# Patient Record
Sex: MALE | Race: WHITE | URBAN - METROPOLITAN AREA
[De-identification: names, ages, dates, MRNs, and addresses within clinical notes are randomized per-mention and may not be internally consistent; named-entity substitution may affect disease eponyms.]

---

## 2017-01-24 ENCOUNTER — EMERGENCY (EMERGENCY)
Facility: HOSPITAL | Age: 52
LOS: 0 days | Discharge: HOME | End: 2017-01-24
Admitting: EMERGENCY MEDICINE
Payer: COMMERCIAL

## 2017-01-24 DIAGNOSIS — S63.619A UNSPECIFIED SPRAIN OF UNSPECIFIED FINGER, INITIAL ENCOUNTER: ICD-10-CM

## 2017-01-24 DIAGNOSIS — S69.82XA OTHER SPECIFIED INJURIES OF LEFT WRIST, HAND AND FINGER(S), INITIAL ENCOUNTER: ICD-10-CM

## 2017-01-24 DIAGNOSIS — T14.8 OTHER INJURY OF UNSPECIFIED BODY REGION: ICD-10-CM

## 2017-01-24 DIAGNOSIS — S63.602A UNSPECIFIED SPRAIN OF LEFT THUMB, INITIAL ENCOUNTER: ICD-10-CM
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
## 2017-01-24 PROCEDURE — 99284 EMERGENCY DEPT VISIT MOD MDM: CPT | Mod: 25

## 2017-01-24 PROCEDURE — 73090 X-RAY EXAM OF FOREARM: CPT | Mod: 26,LT

## 2017-01-24 PROCEDURE — 73110 X-RAY EXAM OF WRIST: CPT | Mod: 26,LT

## 2017-01-24 PROCEDURE — 70450 CT HEAD/BRAIN W/O DYE: CPT | Mod: 26

## 2017-01-24 PROCEDURE — 73130 X-RAY EXAM OF HAND: CPT | Mod: 26,LT

## 2017-01-24 PROCEDURE — 29125 APPL SHORT ARM SPLINT STATIC: CPT | Mod: LT

## 2017-01-26 ENCOUNTER — TRANSCRIPTION ENCOUNTER (OUTPATIENT)
Age: 52
End: 2017-01-26

## 2017-06-16 ENCOUNTER — APPOINTMENT (OUTPATIENT)
Dept: NEUROLOGY | Facility: CLINIC | Age: 52
End: 2017-06-16

## 2017-06-16 VITALS — BODY MASS INDEX: 39.33 KG/M2 | SYSTOLIC BLOOD PRESSURE: 132 MMHG | DIASTOLIC BLOOD PRESSURE: 84 MMHG | WEIGHT: 290 LBS

## 2017-06-16 DIAGNOSIS — H53.8 OTHER VISUAL DISTURBANCES: ICD-10-CM

## 2017-06-16 RX ORDER — VITAMIN K2 90 MCG
125 MCG CAPSULE ORAL
Refills: 0 | Status: ACTIVE | COMMUNITY

## 2017-06-16 RX ORDER — AMOXICILLIN 875 MG/1
875 TABLET, FILM COATED ORAL
Qty: 20 | Refills: 0 | Status: COMPLETED | COMMUNITY
Start: 2017-03-06

## 2017-06-16 RX ORDER — DILTIAZEM HYDROCHLORIDE 180 MG/1
180 CAPSULE, EXTENDED RELEASE ORAL
Qty: 90 | Refills: 0 | Status: ACTIVE | COMMUNITY
Start: 2017-05-05

## 2017-07-05 ENCOUNTER — OUTPATIENT (OUTPATIENT)
Dept: OUTPATIENT SERVICES | Facility: HOSPITAL | Age: 52
LOS: 1 days | End: 2017-07-05
Payer: COMMERCIAL

## 2017-07-05 DIAGNOSIS — M50.13 CERVICAL DISC DISORDER WITH RADICULOPATHY, CERVICOTHORACIC REGION: ICD-10-CM

## 2017-07-06 ENCOUNTER — TRANSCRIPTION ENCOUNTER (OUTPATIENT)
Age: 52
End: 2017-07-06

## 2017-07-06 ENCOUNTER — APPOINTMENT (OUTPATIENT)
Dept: ORTHOPEDIC SURGERY | Facility: CLINIC | Age: 52
End: 2017-07-06

## 2017-07-19 ENCOUNTER — TRANSCRIPTION ENCOUNTER (OUTPATIENT)
Age: 52
End: 2017-07-19

## 2017-07-19 ENCOUNTER — OUTPATIENT (OUTPATIENT)
Dept: OUTPATIENT SERVICES | Facility: HOSPITAL | Age: 52
LOS: 1 days | End: 2017-07-19
Payer: COMMERCIAL

## 2017-07-19 DIAGNOSIS — M54.16 RADICULOPATHY, LUMBAR REGION: ICD-10-CM

## 2017-07-19 PROCEDURE — 77003 FLUOROGUIDE FOR SPINE INJECT: CPT

## 2017-07-19 PROCEDURE — 64484 NJX AA&/STRD TFRM EPI L/S EA: CPT | Mod: 50

## 2017-07-19 PROCEDURE — 64483 NJX AA&/STRD TFRM EPI L/S 1: CPT | Mod: 50

## 2017-07-19 PROCEDURE — 76000 FLUOROSCOPY <1 HR PHYS/QHP: CPT

## 2017-07-19 PROCEDURE — 62321 NJX INTERLAMINAR CRV/THRC: CPT

## 2017-10-10 ENCOUNTER — TRANSCRIPTION ENCOUNTER (OUTPATIENT)
Age: 52
End: 2017-10-10

## 2017-11-29 ENCOUNTER — APPOINTMENT (OUTPATIENT)
Dept: NEUROLOGY | Facility: CLINIC | Age: 52
End: 2017-11-29
Payer: COMMERCIAL

## 2017-11-29 VITALS
DIASTOLIC BLOOD PRESSURE: 62 MMHG | BODY MASS INDEX: 39.28 KG/M2 | SYSTOLIC BLOOD PRESSURE: 122 MMHG | HEIGHT: 72 IN | WEIGHT: 290 LBS

## 2017-11-29 DIAGNOSIS — F07.81 POSTCONCUSSIONAL SYNDROME: ICD-10-CM

## 2017-11-29 DIAGNOSIS — M54.12 RADICULOPATHY, CERVICAL REGION: ICD-10-CM
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
## 2017-11-29 PROCEDURE — 99214 OFFICE O/P EST MOD 30 MIN: CPT

## 2018-01-22 ENCOUNTER — TRANSCRIPTION ENCOUNTER (OUTPATIENT)
Age: 53
End: 2018-01-22

## 2018-05-15 ENCOUNTER — APPOINTMENT (OUTPATIENT)
Dept: NEUROLOGY | Facility: CLINIC | Age: 53
End: 2018-05-15

## 2019-03-15 ENCOUNTER — APPOINTMENT (OUTPATIENT)
Dept: NEUROLOGY | Facility: CLINIC | Age: 54
End: 2019-03-15
Payer: OTHER MISCELLANEOUS

## 2019-03-15 VITALS
SYSTOLIC BLOOD PRESSURE: 130 MMHG | BODY MASS INDEX: 40.63 KG/M2 | WEIGHT: 300 LBS | DIASTOLIC BLOOD PRESSURE: 80 MMHG | HEIGHT: 72 IN

## 2019-03-15 PROCEDURE — 99214 OFFICE O/P EST MOD 30 MIN: CPT

## 2019-09-06 ENCOUNTER — RESULT REVIEW (OUTPATIENT)
Age: 54
End: 2019-09-06

## 2020-02-11 ENCOUNTER — TRANSCRIPTION ENCOUNTER (OUTPATIENT)
Age: 55
End: 2020-02-11

## 2020-02-12 ENCOUNTER — OUTPATIENT (OUTPATIENT)
Dept: OUTPATIENT SERVICES | Facility: HOSPITAL | Age: 55
LOS: 1 days | End: 2020-02-12
Payer: COMMERCIAL

## 2020-02-12 DIAGNOSIS — M54.16 RADICULOPATHY, LUMBAR REGION: ICD-10-CM

## 2020-02-12 PROCEDURE — 76000 FLUOROSCOPY <1 HR PHYS/QHP: CPT

## 2020-02-12 PROCEDURE — 64483 NJX AA&/STRD TFRM EPI L/S 1: CPT | Mod: 50

## 2020-02-18 ENCOUNTER — TRANSCRIPTION ENCOUNTER (OUTPATIENT)
Age: 55
End: 2020-02-18

## 2020-02-19 ENCOUNTER — OUTPATIENT (OUTPATIENT)
Dept: OUTPATIENT SERVICES | Facility: HOSPITAL | Age: 55
LOS: 1 days | End: 2020-02-19
Payer: COMMERCIAL

## 2020-02-19 DIAGNOSIS — M54.12 RADICULOPATHY, CERVICAL REGION: ICD-10-CM

## 2020-02-19 PROCEDURE — 77003 FLUOROGUIDE FOR SPINE INJECT: CPT

## 2020-02-19 PROCEDURE — 62321 NJX INTERLAMINAR CRV/THRC: CPT

## 2020-08-07 DIAGNOSIS — Z01.818 ENCOUNTER FOR OTHER PREPROCEDURAL EXAMINATION: ICD-10-CM

## 2020-08-09 ENCOUNTER — APPOINTMENT (OUTPATIENT)
Dept: DISASTER EMERGENCY | Facility: CLINIC | Age: 55
End: 2020-08-09

## 2020-08-10 LAB — SARS-COV-2 N GENE NPH QL NAA+PROBE: NOT DETECTED

## 2020-08-11 ENCOUNTER — TRANSCRIPTION ENCOUNTER (OUTPATIENT)
Age: 55
End: 2020-08-11

## 2020-08-12 ENCOUNTER — OUTPATIENT (OUTPATIENT)
Dept: OUTPATIENT SERVICES | Facility: HOSPITAL | Age: 55
LOS: 1 days | End: 2020-08-12
Payer: COMMERCIAL

## 2020-08-12 DIAGNOSIS — M54.16 RADICULOPATHY, LUMBAR REGION: ICD-10-CM

## 2020-08-12 PROCEDURE — 64484 NJX AA&/STRD TFRM EPI L/S EA: CPT | Mod: LT

## 2020-08-12 PROCEDURE — 64483 NJX AA&/STRD TFRM EPI L/S 1: CPT | Mod: LT

## 2020-08-12 PROCEDURE — 76000 FLUOROSCOPY <1 HR PHYS/QHP: CPT

## 2021-03-19 ENCOUNTER — APPOINTMENT (OUTPATIENT)
Dept: NEUROLOGY | Facility: CLINIC | Age: 56
End: 2021-03-19
Payer: COMMERCIAL

## 2021-03-19 VITALS
TEMPERATURE: 98 F | HEIGHT: 72 IN | BODY MASS INDEX: 42.66 KG/M2 | HEART RATE: 89 BPM | DIASTOLIC BLOOD PRESSURE: 84 MMHG | SYSTOLIC BLOOD PRESSURE: 134 MMHG | WEIGHT: 315 LBS

## 2021-03-19 DIAGNOSIS — M54.16 RADICULOPATHY, LUMBAR REGION: ICD-10-CM

## 2021-03-19 DIAGNOSIS — H81.90 UNSPECIFIED DISORDER OF VESTIBULAR FUNCTION, UNSPECIFIED EAR: ICD-10-CM

## 2021-03-19 DIAGNOSIS — R25.2 CRAMP AND SPASM: ICD-10-CM

## 2021-03-19 PROCEDURE — 99072 ADDL SUPL MATRL&STAF TM PHE: CPT

## 2021-03-19 PROCEDURE — 99215 OFFICE O/P EST HI 40 MIN: CPT

## 2021-03-19 RX ORDER — ROSUVASTATIN CALCIUM 10 MG/1
10 TABLET, FILM COATED ORAL
Refills: 0 | Status: ACTIVE | COMMUNITY

## 2021-03-19 RX ORDER — QUETIAPINE 50 MG/1
50 TABLET, FILM COATED ORAL
Refills: 0 | Status: ACTIVE | COMMUNITY

## 2021-03-19 RX ORDER — DULOXETINE HYDROCHLORIDE 60 MG/1
60 CAPSULE, DELAYED RELEASE ORAL
Refills: 0 | Status: COMPLETED | COMMUNITY
End: 2021-03-19

## 2021-03-19 RX ORDER — PSYLLIUM HUSK 0.4 G
CAPSULE ORAL
Refills: 0 | Status: ACTIVE | COMMUNITY

## 2021-03-19 NOTE — REVIEW OF SYSTEMS
[Sleep Disturbances] : sleep disturbances [Anxiety] : anxiety [Depression] : depression [As Noted in HPI] : as noted in HPI [Decr. Concentrating Ability] : decreased concentrating ability [Numbness] : numbness [Tingling] : tingling [Negative] : Heme/Lymph [Poor Coordination] : poor coordination [Dizziness] : dizziness [Recent Weight Gain (___ Lbs)] : recent [unfilled] ~Ulb weight gain [Leg Weakness] : leg weakness [Recent Weight Loss (___ Lbs)] : no recent weight loss [Eyesight Problems] : no eyesight problems [Skin Lesions] : no skin lesions [Muscle Weakness] : no muscle weakness [de-identified] : shaking of hands. [FreeTextEntry4] : snoring

## 2021-03-19 NOTE — PHYSICAL EXAM
[General Appearance - Alert] : alert [General Appearance - In No Acute Distress] : in no acute distress [Impaired Insight] : insight and judgment were intact [Person] : oriented to person [Place] : oriented to place [Time] : oriented to time [Short Term Intact] : short term memory intact [Remote Intact] : remote memory intact [Registration Intact] : recent registration memory intact [Span Intact] : the attention span was normal [Concentration Intact] : normal concentrating ability [Naming Objects] : no difficulty naming common objects [Repeating Phrases] : no difficulty repeating a phrase [Fluency] : fluency intact [Comprehension] : comprehension intact [Current Events] : adequate knowledge of current events [Past History] : adequate knowledge of personal past history [Cranial Nerves Optic (II)] : visual acuity intact bilaterally,  visual fields full to confrontation, pupils equal round and reactive to light [Cranial Nerves Oculomotor (III)] : extraocular motion intact [Cranial Nerves Trigeminal (V)] : facial sensation intact symmetrically [Cranial Nerves Facial (VII)] : face symmetrical [Cranial Nerves Vestibulocochlear (VIII)] : hearing was intact bilaterally [Cranial Nerves Glossopharyngeal (IX)] : tongue and palate midline [Cranial Nerves Accessory (XI - Cranial And Spinal)] : head turning and shoulder shrug symmetric [Cranial Nerves Hypoglossal (XII)] : there was no tongue deviation with protrusion [Motor Tone] : muscle tone was normal in all four extremities [No Muscle Atrophy] : normal bulk in all four extremities [Sensation Tactile Decrease] : light touch was intact [Pain / Temp Decrease Radial Forearm, Thumb, Index - Rt Only] : diminished right radial forearm, thumb, and index finger (C6) [Pain / Temperature Decrease Middle Finger Only - Right Only] : diminished right middle finger (C7) [Pain / Temp Decr 4-5th Dig, Ulnar Hand, Dist Forearm Rt Only] : diminished right 4-5th digits and distal forearm (C8) [2+] : Brachioradialis left 2+ [No APD] : no afferent pupillary defect [Full Visual Field] : full visual field [Hearing Threshold Finger Rub Not [place]] : hearing was normal [Auscultation Breath Sounds / Voice Sounds] : lungs were clear to auscultation bilaterally [Heart Sounds] : normal S1 and S2 [Arterial Pulses Carotid] : carotid pulses were normal with no bruits [Edema] : there was no peripheral edema [No Spinal Tenderness] : no spinal tenderness [] : no rash [Papilledema Of Both Eyes] : no papilledema [Mood] : the mood was normal [Romberg's Sign] : a positive Romberg's sign was present [1+] : Patella left 1+ [0] : Ankle jerk left 0 [PERRL With Normal Accommodation] : pupils were equal in size, round, reactive to light, with normal accommodation [Extraocular Movements] : extraocular movements were intact [Involuntary Movements] : no involuntary movements were seen [FreeTextEntry1] : Obese [Past-pointing] : there was no past-pointing [Tremor] : no tremor present [Coordination - Dysmetria Impaired Finger-to-Nose Bilateral] : not present [Plantar Reflex Right Only] : normal on the right [Plantar Reflex Left Only] : normal on the left [FreeTextEntry6] : motor strength: No pronator drift, all muscle groups in the upper 5/5, left lower extremity HF 4+/5, others 5/5. [FreeTextEntry8] : Mildly antalgic gait, favors right leg

## 2021-03-19 NOTE — HISTORY OF PRESENT ILLNESS
[FreeTextEntry1] : 55-year-old male,with PMHx HTN, HLD, A-Fib, MIKY, anxiety/depression and chronic neck/LBP's, occasional vertigo, exacerbated by postural changes, cramps of hands Improved after carpal tunnel release surgery, is known to me in the past, last seen on 11/29/17 presents today with complaints of dizziness and gait instability\par \par Patient reports that he fell off the horse back about 6 months ago, landed on his left shoulder, he did not lose consciousness, He had persistent left shoulder pain following the fall, noted remarkable relief of left shoulder pain\par after surgery. Patient reports since this fall he has been experiencing dizziness and feels off balance, Denies visual blurring, double vision, headache, speech disturbance, tinnitus or ear pain. He has not had any falls but reports chronic low back pain for which he has received epidural steroid injections,he reports he is limping slightly as he feel his left leg is weak.\par \par Patient admits that he has gained tremendous amount of date, he has noted improvement off cramps of hands after he underwent carpal tunnel release surgery.\par \par

## 2021-03-19 NOTE — DISCUSSION/SUMMARY
[FreeTextEntry1] : 55-year-old male,with PMHx HTN, HLD, A-Fib, MIKY, anxiety/depression and chronic neck/LBP's, occasional vertigo, exacerbated by postural changes, cramps of hands Improved after carpal tunnel release surgery, presents today with complaints of dizziness and gait instability, symptoms started after he fell off the horse back about 6 months ago.\par \par # Most probably vestibular dysfunction; rule out CNS ischemic/ inflammatory lesions\par \par - I have recommended vestibular therapy twice weekly for 3 weeks\par - Will obtain MRI brain w/wo contrast\par \par \par - I have advised the patient to restart Crestor and continue aspirin\par \par #2) Lumbar DJD, possible left L 3/4 radiculopathy, s/p epidural steroid injections\par \par - F/U with ortho-pain management\par \par #3) Carpal tunnel syndromes bilateral S/P surgery with remarkable resolution of symptoms\par \par \par \par

## 2021-03-19 NOTE — DATA REVIEWED
[de-identified] : 4/8/16: MRI brain w/wo contrast: unchanged compared to prior study, multiple small foci of T2/flair hyperintensity in the subcortical white\par matter of bifrontal parietal lobes, no abnormal enhancement.\par 8/28/15; unchanged multiple foci of scattered flair signal hyperintensity in the bifrontal, right temporal and left parietal suboccipital white matter. These are nonspecific and reflect microvascular ischemia, demyelination or gliosis.\par MRI of the brain performed on 5/11/15; there was no evidence of acute infarct; several small foci of T2/fair intense hyperintensities in the bifrontal, left parietal and left temporal subcortical white matter were noted; differential includes migraine sequelae, demyelination, vasculitis, gliosis and early chronic microvascular ischemic changes.\par \par  [de-identified] :  24-hour ambulatory EEG monitoring performed from 5/7/15 -5/8/15 was ordered as a normal ambulate reduced as EEG recording.\par \par  [de-identified] : 5/19/16: EMG/NCV studies of upper extremities. The study is abnormal. The electrophysiological findings are consistent with mild-moderate median neuropathy at the wrists bilaterally. In addition, there is evidence of C5-6 radiculopathy; left more than right side.\par MRI cervical spine w/wo contrast: 8/28/15; minimal disc degeneration at C6-7 with mild loss of disc height. Mild narrowing of the left C4-5, bilateral C5-6 and left C6 to 7 neuroforamina do put on hold vertebral spurring. No cord impingement.\par MRI cervical spine: 4/17/15; new small C3-4 right-sided disc herniation which abuts the ventral spinal cord without compression. Multilevel degenerative changes superimposed on mild degree of congenital spinal canal stenosis. Unchanged shallow C6-7 proximal right foraminal disc herniation with moderate-severe right foraminal stenosis. Unchanged C5-6 moderate-severe bilateral foraminal stenosis.\par MRI lumbar spine: 4/17/15; mild right neural foraminal narrowing secondary to disc bulges. No significant spinal canal stenosis.

## 2021-03-23 ENCOUNTER — APPOINTMENT (OUTPATIENT)
Dept: DISASTER EMERGENCY | Facility: CLINIC | Age: 56
End: 2021-03-23

## 2021-03-24 LAB — SARS-COV-2 N GENE NPH QL NAA+PROBE: NOT DETECTED

## 2021-04-09 ENCOUNTER — OUTPATIENT (OUTPATIENT)
Dept: OUTPATIENT SERVICES | Facility: HOSPITAL | Age: 56
LOS: 1 days | End: 2021-04-09
Payer: COMMERCIAL

## 2021-04-09 ENCOUNTER — APPOINTMENT (OUTPATIENT)
Dept: MRI IMAGING | Facility: CLINIC | Age: 56
End: 2021-04-09
Payer: COMMERCIAL

## 2021-04-09 DIAGNOSIS — H81.90 UNSPECIFIED DISORDER OF VESTIBULAR FUNCTION, UNSPECIFIED EAR: ICD-10-CM

## 2021-04-09 PROCEDURE — A9585: CPT

## 2021-04-09 PROCEDURE — 70553 MRI BRAIN STEM W/O & W/DYE: CPT

## 2021-04-09 PROCEDURE — 70553 MRI BRAIN STEM W/O & W/DYE: CPT | Mod: 26

## 2021-06-01 ENCOUNTER — APPOINTMENT (OUTPATIENT)
Dept: NEUROLOGY | Facility: CLINIC | Age: 56
End: 2021-06-01

## 2021-10-19 ENCOUNTER — APPOINTMENT (OUTPATIENT)
Dept: DISASTER EMERGENCY | Facility: CLINIC | Age: 56
End: 2021-10-19

## 2021-10-20 LAB — SARS-COV-2 N GENE NPH QL NAA+PROBE: NOT DETECTED

## 2022-03-16 ENCOUNTER — TRANSCRIPTION ENCOUNTER (OUTPATIENT)
Age: 57
End: 2022-03-16

## 2022-05-19 ENCOUNTER — NON-APPOINTMENT (OUTPATIENT)
Age: 57
End: 2022-05-19

## 2022-06-02 ENCOUNTER — NON-APPOINTMENT (OUTPATIENT)
Age: 57
End: 2022-06-02

## 2023-06-27 ENCOUNTER — APPOINTMENT (OUTPATIENT)
Dept: NEUROLOGY | Facility: CLINIC | Age: 58
End: 2023-06-27
Payer: MEDICARE

## 2023-06-27 VITALS
SYSTOLIC BLOOD PRESSURE: 137 MMHG | DIASTOLIC BLOOD PRESSURE: 77 MMHG | WEIGHT: 315 LBS | HEIGHT: 71 IN | TEMPERATURE: 97.6 F | HEART RATE: 77 BPM | BODY MASS INDEX: 44.1 KG/M2

## 2023-06-27 DIAGNOSIS — G37.9 DEMYELINATING DISEASE OF CENTRAL NERVOUS SYSTEM, UNSPECIFIED: ICD-10-CM

## 2023-06-27 DIAGNOSIS — R41.3 OTHER AMNESIA: ICD-10-CM

## 2023-06-27 PROCEDURE — 99214 OFFICE O/P EST MOD 30 MIN: CPT

## 2023-06-27 RX ORDER — TIRZEPATIDE 2.5 MG/.5ML
2.5 INJECTION, SOLUTION SUBCUTANEOUS
Refills: 0 | Status: ACTIVE | COMMUNITY

## 2023-06-27 RX ORDER — GABAPENTIN 300 MG
300 TABLET ORAL 3 TIMES DAILY
Refills: 0 | Status: ACTIVE | COMMUNITY

## 2023-06-27 RX ORDER — DULOXETINE HYDROCHLORIDE 30 MG/1
30 CAPSULE, DELAYED RELEASE ORAL
Refills: 0 | Status: DISCONTINUED | COMMUNITY
End: 2023-06-27

## 2023-06-27 RX ORDER — DULOXETINE HYDROCHLORIDE 60 MG/1
60 CAPSULE, DELAYED RELEASE ORAL TWICE DAILY
Refills: 0 | Status: ACTIVE | COMMUNITY

## 2023-06-27 RX ORDER — CYCLOBENZAPRINE HYDROCHLORIDE 10 MG/1
10 TABLET, FILM COATED ORAL
Qty: 60 | Refills: 0 | Status: DISCONTINUED | COMMUNITY
Start: 2017-01-25 | End: 2023-06-27

## 2023-06-27 RX ORDER — FAMOTIDINE 40 MG/1
40 TABLET, FILM COATED ORAL TWICE DAILY
Refills: 0 | Status: ACTIVE | COMMUNITY

## 2023-06-27 RX ORDER — BUPROPION HYDROCHLORIDE 300 MG/1
300 TABLET, EXTENDED RELEASE ORAL
Refills: 0 | Status: DISCONTINUED | COMMUNITY
End: 2023-06-27

## 2023-06-27 RX ORDER — RANITIDINE HYDROCHLORIDE 300 MG/1
TABLET, FILM COATED ORAL
Refills: 0 | Status: DISCONTINUED | COMMUNITY
End: 2023-06-27

## 2023-06-27 NOTE — REASON FOR VISIT
[Follow-Up: _____] : a [unfilled] follow-up visit [FreeTextEntry1] : For memory decline and cognitive changes

## 2023-06-27 NOTE — DISCUSSION/SUMMARY
[FreeTextEntry1] : 57-year-old M,with PMHx HTN, HLD, A-Fib, MIKY, anxiety/depression and chronic neck/LBP's, occasional vertigo, cramps of hands Improved after carpal tunnel release surgery, dizziness and gait instability, after he fell off the horse back 2 years ago, has improved, now he presents with  difficulty with short-term memory and word retrieval.\par \par #Mild cognitive impairment ; possibly multiple etiologies \par \par - I have recommended B12, folate and TSH levels\par -Cognitive assessment in 10-12 weeks\par \par # Prior history of probable CNS demyelinating disease\par \par - Will obtain MRI brain w/wo contrast\par \par #Right SI joint pain and lumbar DJD, possible left L 3/4 radiculopathy, s/p epidural steroid injections\par \par - F/U with ortho-pain management\par \par # Carpal tunnel syndromes bilateral S/P surgery with remarkable resolution of symptoms\par \par \par \par

## 2023-06-27 NOTE — PHYSICAL EXAM
[General Appearance - Alert] : alert [General Appearance - In No Acute Distress] : in no acute distress [Impaired Insight] : insight and judgment were intact [Mood] : the mood was normal [Person] : oriented to person [Place] : oriented to place [Time] : oriented to time [Remote Intact] : remote memory intact [Registration Intact] : recent registration memory intact [Span Intact] : the attention span was normal [Concentration Intact] : normal concentrating ability [Naming Objects] : no difficulty naming common objects [Repeating Phrases] : no difficulty repeating a phrase [Fluency] : fluency intact [Comprehension] : comprehension intact [Current Events] : adequate knowledge of current events [Cranial Nerves Optic (II)] : visual acuity intact bilaterally,  visual fields full to confrontation, pupils equal round and reactive to light [Cranial Nerves Oculomotor (III)] : extraocular motion intact [Cranial Nerves Trigeminal (V)] : facial sensation intact symmetrically [Cranial Nerves Facial (VII)] : face symmetrical [Cranial Nerves Vestibulocochlear (VIII)] : hearing was intact bilaterally [Cranial Nerves Glossopharyngeal (IX)] : tongue and palate midline [Cranial Nerves Accessory (XI - Cranial And Spinal)] : head turning and shoulder shrug symmetric [Cranial Nerves Hypoglossal (XII)] : there was no tongue deviation with protrusion [Motor Tone] : muscle tone was normal in all four extremities [No Muscle Atrophy] : normal bulk in all four extremities [Sensation Tactile Decrease] : light touch was intact [Romberg's Sign] : a positive Romberg's sign was present [2+] : Brachioradialis left 2+ [1+] : Patella left 1+ [0] : Ankle jerk left 0 [PERRL With Normal Accommodation] : pupils were equal in size, round, reactive to light, with normal accommodation [Extraocular Movements] : extraocular movements were intact [No APD] : no afferent pupillary defect [Full Visual Field] : full visual field [Hearing Threshold Finger Rub Not [place]] : hearing was normal [Auscultation Breath Sounds / Voice Sounds] : lungs were clear to auscultation bilaterally [Heart Sounds] : normal S1 and S2 [Arterial Pulses Carotid] : carotid pulses were normal with no bruits [Edema] : there was no peripheral edema [No Spinal Tenderness] : no spinal tenderness [Involuntary Movements] : no involuntary movements were seen [] : no rash [Neck Cervical Mass (___cm)] : no neck mass was observed [FreeTextEntry1] : Obese [Past-pointing] : there was no past-pointing [Tremor] : no tremor present [Coordination - Dysmetria Impaired Finger-to-Nose Bilateral] : not present [Plantar Reflex Right Only] : normal on the right [Plantar Reflex Left Only] : normal on the left [FreeTextEntry6] : Motor strength: No pronator drift, all muscle groups in the upper 5/5, Right lower extremity HF 4+/5, others 5/5. [FreeTextEntry8] : Mildly antalgic gait, right-hip

## 2023-06-27 NOTE — HISTORY OF PRESENT ILLNESS
[FreeTextEntry1] : Patient is here for a follow-up visit today, last seen on 4/14/2021.  Reports that over the past 10-12 months, he has noted that his memory is getting worse, he has difficulty with word retrieval, at times he knows what he has to say but is unable to bring the words out, he reports his wife has noticed it and keeps on stressing to him to come out with the right answer, few weeks ago when he met with his children on Father's Day, they pointed out to him that he was mispronouncing words.  Patient reports that he is otherwise functional, he has numerous health issues, he has been seeing an orthopedist for right SI joint pain, received an injection recently which has provided some relief, he has poor balance he reports 1 fall a month ago, did not result in any major injury.\par \par Patient follows up with behavioral health specialist, for depression, he is on gabapentin, in addition he is on Cymbalta for pain, he also follows up with pain management regularly.\par \par Denies visual blurring, double vision, tinnitus, dizziness, speech disturbance, focal motor weakness in upper or lower extremities, he has numbness and tremulousness of left hand, it worsens when he is driving.\par \par Patient reports he sleeps very poorly, he is using CPAP, his wife apparently sleeps with TV on, this makes his sleep disrupted.

## 2023-06-27 NOTE — REVIEW OF SYSTEMS
[Recent Weight Gain (___ Lbs)] : recent [unfilled] ~Ulb weight gain [Sleep Disturbances] : sleep disturbances [Depression] : depression [As Noted in HPI] : as noted in HPI [Decr. Concentrating Ability] : decreased concentrating ability [Leg Weakness] : leg weakness [Poor Coordination] : poor coordination [Numbness] : numbness [Tingling] : tingling [Negative] : Heme/Lymph [Memory Lapses or Loss] : memory loss [Recent Weight Loss (___ Lbs)] : no recent weight loss [Anxiety] : no anxiety [Dizziness] : no dizziness [Eyesight Problems] : no eyesight problems [Skin Lesions] : no skin lesions [Muscle Weakness] : no muscle weakness [de-identified] : tingling / shaking of L hand.\par Right leg paresthesias [FreeTextEntry4] : snoring

## 2023-06-27 NOTE — DATA REVIEWED
[de-identified] : 4/9/21: MRI brain w/wo contrast: showed no significant interval change in the scattered T2/FLAIR hyperintensities compared with the previous MRI brain of 4/8/2016.  No acute infarct or abnormal enhancement seen.\par 4/8/16: MRI brain w/wo contrast: unchanged compared to prior study, multiple small foci of T2/flair hyperintensity in the subcortical white\par matter of bifrontal parietal lobes, no abnormal enhancement.\par 8/28/15; unchanged multiple foci of scattered flair signal hyperintensity in the bifrontal, right temporal and left parietal suboccipital white matter. These are nonspecific and reflect microvascular ischemia, demyelination or gliosis.\par MRI of the brain performed on 5/11/15; there was no evidence of acute infarct; several small foci of T2/fair intense hyperintensities in the bifrontal, left parietal and left temporal subcortical white matter were noted; differential includes migraine sequelae, demyelination, vasculitis, gliosis and early chronic microvascular ischemic changes.\par \par  [de-identified] :  24-hour ambulatory EEG monitoring performed from 5/7/15 -5/8/15 was ordered as a normal ambulate reduced as EEG recording.\par \par  [de-identified] : 5/19/16: EMG/NCV studies of upper extremities. The study is abnormal. The electrophysiological findings are consistent with mild-moderate median neuropathy at the wrists bilaterally. In addition, there is evidence of C5-6 radiculopathy; left more than right side.\par MRI cervical spine w/wo contrast: 8/28/15; minimal disc degeneration at C6-7 with mild loss of disc height. Mild narrowing of the left C4-5, bilateral C5-6 and left C6 to 7 neuroforamina do put on hold vertebral spurring. No cord impingement.\par MRI cervical spine: 4/17/15; new small C3-4 right-sided disc herniation which abuts the ventral spinal cord without compression. Multilevel degenerative changes superimposed on mild degree of congenital spinal canal stenosis. Unchanged shallow C6-7 proximal right foraminal disc herniation with moderate-severe right foraminal stenosis. Unchanged C5-6 moderate-severe bilateral foraminal stenosis.\par MRI lumbar spine: 4/17/15; mild right neural foraminal narrowing secondary to disc bulges. No significant spinal canal stenosis.

## 2023-06-30 ENCOUNTER — NON-APPOINTMENT (OUTPATIENT)
Age: 58
End: 2023-06-30

## 2023-07-11 ENCOUNTER — NON-APPOINTMENT (OUTPATIENT)
Age: 58
End: 2023-07-11

## 2023-08-11 ENCOUNTER — NON-APPOINTMENT (OUTPATIENT)
Age: 58
End: 2023-08-11

## 2023-09-19 ENCOUNTER — APPOINTMENT (OUTPATIENT)
Dept: NEUROLOGY | Facility: CLINIC | Age: 58
End: 2023-09-19
Payer: MEDICARE

## 2023-09-19 VITALS
HEART RATE: 82 BPM | HEIGHT: 72 IN | SYSTOLIC BLOOD PRESSURE: 134 MMHG | WEIGHT: 315 LBS | BODY MASS INDEX: 42.66 KG/M2 | DIASTOLIC BLOOD PRESSURE: 82 MMHG

## 2023-09-19 DIAGNOSIS — F06.70 MILD NEUROCOGNITIVE DISORDER DUE TO KNOWN PHYSIOLOGICAL CONDITION WITHOUT BEHAVIORAL DISTURBANCE: ICD-10-CM

## 2023-09-19 PROCEDURE — 96138 PSYCL/NRPSYC TECH 1ST: CPT | Mod: 59

## 2023-09-19 PROCEDURE — 99213 OFFICE O/P EST LOW 20 MIN: CPT | Mod: 25

## 2023-09-19 PROCEDURE — 96132 NRPSYC TST EVAL PHYS/QHP 1ST: CPT | Mod: 59

## 2023-09-20 ENCOUNTER — NON-APPOINTMENT (OUTPATIENT)
Age: 58
End: 2023-09-20

## 2023-11-14 NOTE — H&P ADULT - NSHPREVIEWOFSYSTEMS_GEN_ALL_CORE
CONSTITUTIONAL: No weakness, fevers or chills     EYES/ENT: No visual changes;  No vertigo or throat pain      NECK: No pain or stiffness     RESPIRATORY: No cough, wheezing, hemoptysis; +shortness of breath     CARDIOVASCULAR:  Occasional chest pain last experienced one week ago, denies any  palpitations     GASTROINTESTINAL: No abdominal or epigastric pain. No nausea, vomiting, or hematemesis; No diarrhea or constipation. No melena or hematochezia.     GENITOURINARY: No dysuria, frequency or hematuria     NEUROLOGICAL: No numbness or weakness     SKIN: No itching, burning, rashes, or lesions      All other review of systems is negative unless indicated above

## 2023-11-14 NOTE — H&P ADULT - NSHPLABSRESULTS_GEN_ALL_CORE
EKg (11/15/23):  Echo (1/18/23): EF 60-65%,  PET MPI (1/12/23): EF 43% at rest, 44% at stress, mild hypokinesis LV, no evidence of myocardial infarction or ischemia

## 2023-11-14 NOTE — H&P ADULT - PROBLEM SELECTOR PLAN 1
- SINDI protocol pre hydration: NS 250cc IV bolus x1   - Procedure, its risks, alternatives, benefits and potential complications were discussed in detail. Risks include but not limited to bleeding, infection, allergy, renal failure requiring dialysis, stroke, vascular injury, pericardial tamponade, arrhythmias, MI and even death. Pt is agreeable and has consented for cardiac catheterization with possible stent placement and possible sedation and/ or analgesia.

## 2023-11-14 NOTE — H&P ADULT - HISTORY OF PRESENT ILLNESS
57 y.o male with PMhx of Obesity, HTN, HLD, p.Afib, presented to cardiology office with c/o chest discomfort, pressure/ tightness with activity....     Echo with normal EF, stress test in January/ 2023 with EF 43% at rest, 44% at stress, mild hypokinesis LV, no evidence of myocardial infarction or ischemia.   Pt referred to cardiac cath with possible PCI in given multiple CAD risk factors.  57 y.o male with PMH of Obesity, HTN, HLD, P.Afib, presented to cardiology office with c/o chest discomfort, pressure/ tightness with activity. Echo with normal EF, stress test in January/ 2023 with EF 43% at rest, 44% at stress, mild hypokinesis LV, no evidence of myocardial infarction or ischemia.   Pt referred to cardiac cath with possible PCI in given multiple CAD risk factors.

## 2023-11-14 NOTE — H&P ADULT - NSHPPHYSICALEXAM_GEN_ALL_CORE
Vital Signs Last 24 Hrs  T(C): 36.7 (15 Nov 2023 13:11), Max: 36.7 (15 Nov 2023 13:11)  T(F): 98 (15 Nov 2023 13:11), Max: 98 (15 Nov 2023 13:11)  HR: 90 (15 Nov 2023 13:11) (90 - 90)  BP: 162/87 (15 Nov 2023 13:11) (162/87 - 162/87)  BP(mean): --  RR: 16 (15 Nov 2023 13:11) (16 - 16)  SpO2: 98% (15 Nov 2023 13:11) (98% - 98%)    Constitutional: NAD, well-groomed, well-developed     Neuro: Alert and oriented x 3 Gait steady Speech clear No focal deficits     Neck: No JVD No bruit     Respiratory: CTA B /L    Cardiovascular: S1 and S2, RRR,      Gastrointestinal: BS+, soft, NT/ND     Extremities: No clubbing cyanosis or edema No varicosities     Vascular: 2+ peripheral pulses     Psychiatric: Normal mood, normal affect     Musculoskeletal: 5/5 strength b/l upper and lower extremities     Neurologic: Non-focal, AxOx3.  No neuro deficits     Vascular: Peripheral pulses palpable 2+ bilaterally

## 2023-11-14 NOTE — H&P ADULT - ASSESSMENT
ASA class:  Cr:  GFR:  Bleeding risk:  Teto score:  57 y.o male with PMH of Obesity, HTN, HLD, P.Afib, presented to cardiology office with c/o chest discomfort, pressure/ tightness with activity. Echo with normal EF, stress test in January/ 2023 with EF 43% at rest, 44% at stress, mild hypokinesis LV, no evidence of myocardial infarction or ischemia.   Pt referred to cardiac cath with possible PCI in given multiple CAD risk factors.         ASA class:II  Cr:1.0  GFR:88  Bleeding risk:0.7%  Teto score: 1 Point

## 2023-11-15 ENCOUNTER — OUTPATIENT (OUTPATIENT)
Dept: OUTPATIENT SERVICES | Facility: HOSPITAL | Age: 58
LOS: 1 days | Discharge: HOME | End: 2023-11-15
Payer: MEDICARE

## 2023-11-15 VITALS — HEIGHT: 72 IN | WEIGHT: 315 LBS

## 2023-11-15 VITALS
OXYGEN SATURATION: 95 % | SYSTOLIC BLOOD PRESSURE: 140 MMHG | RESPIRATION RATE: 18 BRPM | DIASTOLIC BLOOD PRESSURE: 90 MMHG | HEART RATE: 87 BPM

## 2023-11-15 DIAGNOSIS — R07.9 CHEST PAIN, UNSPECIFIED: ICD-10-CM

## 2023-11-15 DIAGNOSIS — I20.0 UNSTABLE ANGINA: ICD-10-CM

## 2023-11-15 PROCEDURE — C1887: CPT

## 2023-11-15 PROCEDURE — 99152 MOD SED SAME PHYS/QHP 5/>YRS: CPT

## 2023-11-15 PROCEDURE — C1769: CPT

## 2023-11-15 PROCEDURE — 93005 ELECTROCARDIOGRAM TRACING: CPT | Mod: XU

## 2023-11-15 PROCEDURE — 93458 L HRT ARTERY/VENTRICLE ANGIO: CPT | Mod: 26

## 2023-11-15 PROCEDURE — C1894: CPT

## 2023-11-15 PROCEDURE — 93458 L HRT ARTERY/VENTRICLE ANGIO: CPT

## 2023-11-15 PROCEDURE — 93010 ELECTROCARDIOGRAM REPORT: CPT

## 2023-11-15 RX ORDER — ALBUTEROL 90 UG/1
2 AEROSOL, METERED ORAL
Refills: 0 | DISCHARGE

## 2023-11-15 RX ORDER — DULOXETINE HYDROCHLORIDE 30 MG/1
1 CAPSULE, DELAYED RELEASE ORAL
Refills: 0 | DISCHARGE

## 2023-11-15 RX ORDER — TIOTROPIUM BROMIDE 18 UG/1
2 CAPSULE ORAL; RESPIRATORY (INHALATION)
Refills: 0 | DISCHARGE

## 2023-11-15 RX ORDER — QUETIAPINE FUMARATE 200 MG/1
1 TABLET, FILM COATED ORAL
Refills: 0 | DISCHARGE

## 2023-11-15 RX ORDER — SODIUM CHLORIDE 9 MG/ML
250 INJECTION INTRAMUSCULAR; INTRAVENOUS; SUBCUTANEOUS ONCE
Refills: 0 | Status: COMPLETED | OUTPATIENT
Start: 2023-11-15 | End: 2023-11-15

## 2023-11-15 RX ORDER — GABAPENTIN 400 MG/1
1 CAPSULE ORAL
Refills: 0 | DISCHARGE

## 2023-11-15 RX ORDER — SODIUM CHLORIDE 9 MG/ML
1000 INJECTION INTRAMUSCULAR; INTRAVENOUS; SUBCUTANEOUS
Refills: 0 | Status: DISCONTINUED | OUTPATIENT
Start: 2023-11-15 | End: 2023-11-15

## 2023-11-15 RX ORDER — METHOCARBAMOL 500 MG/1
2 TABLET, FILM COATED ORAL
Refills: 0 | DISCHARGE

## 2023-11-15 RX ORDER — ASPIRIN/CALCIUM CARB/MAGNESIUM 324 MG
1 TABLET ORAL
Refills: 0 | DISCHARGE

## 2023-11-15 RX ORDER — ERGOCALCIFEROL 1.25 MG/1
0 CAPSULE ORAL
Refills: 0 | DISCHARGE

## 2023-11-15 RX ORDER — ROSUVASTATIN CALCIUM 5 MG/1
1 TABLET ORAL
Refills: 0 | DISCHARGE

## 2023-11-15 RX ORDER — CLONAZEPAM 1 MG
1 TABLET ORAL
Refills: 0 | DISCHARGE

## 2023-11-15 RX ORDER — SEMAGLUTIDE 0.68 MG/ML
1 INJECTION, SOLUTION SUBCUTANEOUS
Refills: 0 | DISCHARGE

## 2023-11-15 RX ORDER — FLUTICASONE PROPIONATE 220 MCG
0 AEROSOL WITH ADAPTER (GRAM) INHALATION
Refills: 0 | DISCHARGE

## 2023-11-15 RX ORDER — OMEGA-3 ACID ETHYL ESTERS 1 G
1 CAPSULE ORAL
Refills: 0 | DISCHARGE

## 2023-11-15 RX ORDER — DILTIAZEM HCL 120 MG
1 CAPSULE, EXT RELEASE 24 HR ORAL
Refills: 0 | DISCHARGE

## 2023-11-15 RX ORDER — PANTOPRAZOLE SODIUM 20 MG/1
1 TABLET, DELAYED RELEASE ORAL
Refills: 0 | DISCHARGE

## 2023-11-15 RX ADMIN — SODIUM CHLORIDE 250 MILLILITER(S): 9 INJECTION INTRAMUSCULAR; INTRAVENOUS; SUBCUTANEOUS at 13:47

## 2023-11-15 NOTE — BRIEF OPERATIVE NOTE - NSICDXBRIEFPOSTOP_GEN_ALL_CORE_FT
POST-OP DIAGNOSIS:  Nonocclusive coronary atherosclerosis of native coronary artery 15-Nov-2023 16:22:56  Alma Daniel

## 2023-11-15 NOTE — ASU PATIENT PROFILE, ADULT - PAIN LOCATION, PROFILE
Subjective





- Date & Time of Evaluation


Date of Evaluation: 02/14/16


Time of Evaluation: 10:18





- Subjective


Subjective: 


pt comfortable in bed, no distress, no complaints. no f/c, n/v/d. no further 

hematuria.


tolerating diet


ros negative except intermittent agitation





Objective





- Vital Signs/Intake and Output


Vital Signs (last 24 hours): 


 











Temp Pulse Resp BP Pulse Ox


 


 98.2 F   82   20   102/65   100 


 


 02/14/16 09:05  02/14/16 09:24  02/14/16 09:05  02/14/16 09:24  02/14/16 09:05











- Medications


Medications: 


 Current Medications





Aspirin (Ecotrin)  81 mg PO DAILY [facility]


   Last Admin: 02/14/16 09:19 Dose:  81 mg


Enalapril Maleate (Vasotec)  10 mg PO DAILY [facility]


   Last Admin: 02/14/16 09:23 Dose:  Not Given


Famotidine (Pepcid)  20 mg GT BID [facility]


   Last Admin: 02/14/16 09:19 Dose:  20 mg


Haloperidol Lactate (Haldol)  2.5 mg IM Q6 PRN


   PRN Reason: Agitation


   Last Admin: 02/06/16 10:49 Dose:  2.5 mg


Levetiracetam (Keppra)  500 mg PO BID [facility]


   Last Admin: 02/14/16 09:19 Dose:  500 mg


Lorazepam (Ativan)  1 mg IVP Q4 PRN


   PRN Reason: Agitation


   Last Admin: 02/12/16 13:01 Dose:  1 mg


Metoprolol Tartrate (Lopressor)  100 mg PO Q12 [facility]


   Last Admin: 02/14/16 09:24 Dose:  Not Given


Multivitamins/Vitamin C (Multi-Delyn Liquid)  5 ml PO DAILY [facility]


   Last Admin: 02/14/16 09:20 Dose:  5 ml


Nicotine (Nicoderm Cq)  1 patch TD DAILY [facility]


   Last Admin: 02/14/16 09:20 Dose:  1 patch


Quetiapine Fumarate (Seroquel)  25 mg PO DAILY@1700 [facility]


   Last Admin: 02/13/16 16:35 Dose:  25 mg











- Labs


Labs: 


 





 02/10/16 16:05 





 02/10/16 16:05 





 











PT  11.2 SECONDS (9.4-12.0)   12/14/15  05:20    


 


INR  1.05  (0.89-1.20)   12/14/15  05:20    


 


APTT  36.2 SECONDS (23.1-32.0)  H  12/14/15  05:20    














- Constitutional


Appears: Well, Non-toxic, No Acute Distress





- Head Exam


Head Exam: ATRAUMATIC, NORMAL INSPECTION, NORMOCEPHALIC





- Eye Exam


Eye Exam: EOMI, Normal appearance, PERRL


Pupil Exam: NORMAL ACCOMODATION, PERRL





- ENT Exam


ENT Exam: Mucous Membranes Moist, Normal Exam





- Neck Exam


Neck Exam: Full ROM, Normal Inspection.  absent: Lymphadenopathy





- Respiratory Exam


Respiratory Exam: Clear to Ausculation Bilateral, NORMAL BREATHING PATTERN





- Cardiovascular Exam


Cardiovascular Exam: REGULAR RHYTHM, +S1, +S2.  absent: Murmur





- GI/Abdominal Exam


GI & Abdominal Exam: Soft, Normal Bowel Sounds.  absent: Tenderness





- Extremities Exam


Extremities Exam: Full ROM, Normal Capillary Refill, Normal Inspection.  absent

: Joint Swelling, Pedal Edema





- Back Exam


Back Exam: NORMAL INSPECTION





- Neurological Exam


Neurological Exam: Alert, Altered, Awake, CN II-XII Intact, Normal Gait





- Psychiatric Exam


Psychiatric exam: Normal Affect, Normal Mood





- Skin


Skin Exam: Dry, Intact, Normal Color, Warm





Assessment and Plan


(1) DVT prophylaxis


Assessment & Plan: 


scd and ae hose 


no lovenox


Status: Acute





(2) Scrotal edema


Status: Chronic





(3) Diabetes mellitus with hyperglycemia


Assessment & Plan: 


fsbg 


Status: Chronic





(4) Agitation


Status: Acute





(5) CAD (coronary artery disease)


Assessment & Plan: 


cont meds


Status: Acute





(6) Smoking


Assessment & Plan: 


nicoderm


Status: Acute





(7) Hematuria


Assessment & Plan: 


resolved


uro


Status: Acute





(8) Diverticulitis


Assessment & Plan: 


gi


monitor


diet as tolerated


Status: Acute Back and Neck

## 2023-11-15 NOTE — PACU DISCHARGE NOTE - COMMENTS
Pt discharged home with wife. discharge instructions reviewed with patient with teach back performed. VSS. Right Radial dressing C/D/I. no s/s of hematoma or bleeding. + capillary refill to R hand. Pt ambulate in cath lab with no issues prior to discharge. Pt discharged home with wife. discharge instructions reviewed with patient with teach back performed. VSS. Right Radial dressing C/D/I. no s/s of hematoma or bleeding. + capillary refill to R hand. Pt ambulate in cath lab with no issues prior to discharge. PIV removed

## 2023-11-15 NOTE — ASU PATIENT PROFILE, ADULT - FALL HARM RISK - RISK INTERVENTIONS

## 2023-11-15 NOTE — PROGRESS NOTE ADULT - SUBJECTIVE AND OBJECTIVE BOX
Nurse Practitioner Progress note:     HPI:  57 y.o male with PMH of Obesity, HTN, HLD, P.Afib, presented to cardiology office with c/o chest discomfort, pressure/ tightness with activity. Echo with normal EF, stress test in January/ 2023 with EF 43% at rest, 44% at stress, mild hypokinesis LV, no evidence of myocardial infarction or ischemia.   Pt referred to cardiac cath with possible PCI in given multiple CAD risk factors.  (14 Nov 2023 15:15)    S/P LHC revealing non obstructive CAD         T(C): 36.7 (11-15-23 @ 13:11), Max: 36.7 (11-15-23 @ 13:11)  HR: 90 (11-15-23 @ 13:11) (90 - 90)  BP: 162/87 (11-15-23 @ 13:11) (162/87 - 162/87)  RR: 16 (11-15-23 @ 13:11) (16 - 16)  SpO2: 98% (11-15-23 @ 13:11) (98% - 98%)  Wt(kg): --        PHYSICAL EXAM:  NEURO: Non-focal, AxOx3.  No neuro deficits NECK: Supple, No JVD, No LAD  CHEST/LUNG: Clear to auscultation bilaterally; No wheeze  HEART: s1 s2 Regular rate and rhythm; No murmurs, rubs, or gallops  ABDOMEN: Soft, Nontender, Nondistended; Bowel sounds present X 4 quadrants   EXTREMITIES:  2+ Peripheral Pulses, No clubbing, cyanosis, or edema   VASCULAR: Peripheral pulses palpable 2+ bilaterally  PROCEDURE SITE: right band removed one hour post placement ,Site is without hematoma or bleeding. Sensation and ALONZO intact. Distal pulses palpable 2+, capillary refill < 2 seconds. Patient denies pain, numbness, tingling, CP or SOB. Clean dry dressing applied     PROCEDURE RESULTS: Full report to follow     ASSESSMENT: HPI:  57 y.o male with PMH of Obesity, HTN, HLD, P.Afib, presented to cardiology office with c/o chest discomfort, pressure/ tightness with activity. Echo with normal EF, stress test in January/ 2023 with EF 43% at rest, 44% at stress, mild hypokinesis LV, no evidence of myocardial infarction or ischemia.   Pt referred to cardiac cath with possible PCI in given multiple CAD risk factors.  (14 Nov 2023 15:15)    S/P LHC revealing non obstructive disease     PLAN:  -VS, diet, activity as per post cath orders  -NS @250cc/hr x 4hrs  -Continue current medications  -Plan of care discussed with patient and Dr Arana  -Post cath instructions reviewed, patient verbalizes and understands instructions  - Patient to be discharged home, recommend following up with cardiologist in 2 weeks

## 2023-11-21 DIAGNOSIS — I25.10 ATHEROSCLEROTIC HEART DISEASE OF NATIVE CORONARY ARTERY WITHOUT ANGINA PECTORIS: ICD-10-CM

## 2023-11-21 DIAGNOSIS — R94.39 ABNORMAL RESULT OF OTHER CARDIOVASCULAR FUNCTION STUDY: ICD-10-CM

## 2024-09-09 ENCOUNTER — APPOINTMENT (OUTPATIENT)
Dept: NEUROLOGY | Facility: CLINIC | Age: 59
End: 2024-09-09
Payer: MEDICARE

## 2024-09-09 VITALS
WEIGHT: 310 LBS | BODY MASS INDEX: 41.99 KG/M2 | HEIGHT: 72 IN | DIASTOLIC BLOOD PRESSURE: 73 MMHG | HEART RATE: 71 BPM | SYSTOLIC BLOOD PRESSURE: 110 MMHG

## 2024-09-09 DIAGNOSIS — F06.70 MILD NEUROCOGNITIVE DISORDER DUE TO KNOWN PHYSIOLOGICAL CONDITION WITHOUT BEHAVIORAL DISTURBANCE: ICD-10-CM

## 2024-09-09 DIAGNOSIS — M54.16 RADICULOPATHY, LUMBAR REGION: ICD-10-CM

## 2024-09-09 PROCEDURE — 96138 PSYCL/NRPSYC TECH 1ST: CPT | Mod: 59

## 2024-09-09 PROCEDURE — 96132 NRPSYC TST EVAL PHYS/QHP 1ST: CPT | Mod: 59

## 2024-09-09 PROCEDURE — 99213 OFFICE O/P EST LOW 20 MIN: CPT | Mod: 25

## 2024-09-09 NOTE — DISCUSSION/SUMMARY
[FreeTextEntry1] : 58-year-old M,with PMHx HTN, HLD, A-Fib, MIKY, anxiety/depression, chronic neck/LBP's, occasional vertigo, cramps of hands Improved after CTS release surgery, dizziness and gait instability, after he fell off the horse back 2 years ago, has improved, now with  difficulty with short-term memory and word retrieval.  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

## 2024-09-09 NOTE — HISTORY OF PRESENT ILLNESS
[FreeTextEntry1] : Patient is here for a follow-up visit today, was last seen on 11/9/23. Patient has no new complaints, occasionally he has been told he is inattentive, this has not affected his work or his personal life in any way, he is here for follow-up cognitive testing today  Patient informs me that he underwent lumbar laminectomy with fusion on 7/18/2024 by Dr. Diez at [facility], he has started physical therapy, he is currently able to walk with a cane, he has noted more than 75% improvement of low back pain, concurrently he has also lost about 30 pounds of weight.  Patient reports he feels significantly better

## 2024-09-09 NOTE — DATA REVIEWED
[de-identified] : 7/7/2023: MRI brain w/wo contrast: Multiple white matter lesions may correlate with prior history of demyelinating disease.  No enhancing/active demyelinating plaque is identified. 4/9/21: MRI brain w/wo contrast: showed no significant interval change in the scattered T2/FLAIR hyperintensities compared with the previous MRI brain of 4/8/2016.  No acute infarct or abnormal enhancement seen. 4/8/16: MRI brain w/wo contrast: unchanged compared to prior study, multiple small foci of T2/flair hyperintensity in the subcortical white matter of bifrontal parietal lobes, no abnormal enhancement. 8/28/15; unchanged multiple foci of scattered flair signal hyperintensity in the bifrontal, right temporal and left parietal suboccipital white matter. These are nonspecific and reflect microvascular ischemia, demyelination or gliosis. MRI of the brain performed on 5/11/15; there was no evidence of acute infarct; several small foci of T2/fair intense hyperintensities in the bifrontal, left parietal and left temporal subcortical white matter were noted; differential includes migraine sequelae, demyelination, vasculitis, gliosis and early chronic microvascular ischemic changes.   [de-identified] :  24-hour ambulatory EEG monitoring performed from 5/7/15 -5/8/15 was ordered as a normal ambulate reduced as EEG recording.\par  \par   [de-identified] : 7/31/23: Vitamin B-12 and TSH normal. 5/19/16: EMG/NCV studies of upper extremities. The study is abnormal. The electrophysiological findings are consistent with mild-moderate median neuropathy at the wrists bilaterally. In addition, there is evidence of C5-6 radiculopathy; left more than right side. MRI cervical spine w/wo contrast: 8/28/15; minimal disc degeneration at C6-7 with mild loss of disc height. Mild narrowing of the left C4-5, bilateral C5-6 and left C6 to 7 neuroforamina do put on hold vertebral spurring. No cord impingement. MRI cervical spine: 4/17/15; new small C3-4 right-sided disc herniation which abuts the ventral spinal cord without compression. Multilevel degenerative changes superimposed on mild degree of congenital spinal canal stenosis. Unchanged shallow C6-7 proximal right foraminal disc herniation with moderate-severe right foraminal stenosis. Unchanged C5-6 moderate-severe bilateral foraminal stenosis. MRI lumbar spine: 4/17/15; mild right neural foraminal narrowing secondary to disc bulges. No significant spinal canal stenosis.

## 2024-09-09 NOTE — REVIEW OF SYSTEMS
[Recent Weight Gain (___ Lbs)] : recent [unfilled] ~Ulb weight gain [Sleep Disturbances] : sleep disturbances [Depression] : depression [Leg Weakness] : leg weakness [Poor Coordination] : poor coordination [Negative] : Heme/Lymph [Recent Weight Loss (___ Lbs)] : no recent weight loss [Anxiety] : no anxiety [Memory Lapses or Loss] : no memory loss [Decr. Concentrating Ability] : no decrease in concentrating ability [Numbness] : no numbness [Tingling] : no tingling [Dizziness] : no dizziness [Eyesight Problems] : no eyesight problems [As Noted in HPI] : as noted in HPI [Skin Lesions] : no skin lesions [Muscle Weakness] : no muscle weakness [FreeTextEntry4] : snoring

## 2024-09-09 NOTE — PROCEDURE
[FreeTextEntry1] : Cognitive testing;  Global cognitive score 110.1  More than 1 standard deviation below average in domains: None  Below average in domains: Attention 99.2  Above average in domains: Memory 106.8, executive function 112.5, information processing speed 104.2, visual-spatial 133.4, verbal function 110, motor skills 104.8

## 2025-07-07 ENCOUNTER — NON-APPOINTMENT (OUTPATIENT)
Age: 60
End: 2025-07-07

## 2025-08-26 ENCOUNTER — APPOINTMENT (OUTPATIENT)
Dept: UROLOGY | Facility: CLINIC | Age: 60
End: 2025-08-26

## 2025-09-16 ENCOUNTER — APPOINTMENT (OUTPATIENT)
Dept: UROLOGY | Facility: CLINIC | Age: 60
End: 2025-09-16
Payer: MEDICARE

## 2025-09-16 VITALS
BODY MASS INDEX: 42.66 KG/M2 | SYSTOLIC BLOOD PRESSURE: 126 MMHG | WEIGHT: 315 LBS | DIASTOLIC BLOOD PRESSURE: 84 MMHG | HEART RATE: 75 BPM | HEIGHT: 72 IN

## 2025-09-16 DIAGNOSIS — N48.6 INDURATION PENIS PLASTICA: ICD-10-CM

## 2025-09-16 PROCEDURE — 99205 OFFICE O/P NEW HI 60 MIN: CPT | Mod: 57

## 2025-09-18 ENCOUNTER — NON-APPOINTMENT (OUTPATIENT)
Age: 60
End: 2025-09-18